# Patient Record
Sex: MALE | Race: BLACK OR AFRICAN AMERICAN | Employment: FULL TIME | URBAN - METROPOLITAN AREA
[De-identification: names, ages, dates, MRNs, and addresses within clinical notes are randomized per-mention and may not be internally consistent; named-entity substitution may affect disease eponyms.]

---

## 2017-11-09 RX ORDER — ROSUVASTATIN CALCIUM 40 MG/1
40 TABLET, COATED ORAL DAILY
COMMUNITY

## 2017-11-09 RX ORDER — METFORMIN HYDROCHLORIDE 500 MG/1
1000 TABLET ORAL
COMMUNITY

## 2017-11-09 RX ORDER — LISINOPRIL 5 MG/1
5 TABLET ORAL DAILY
COMMUNITY

## 2017-11-09 RX ORDER — LEVOTHYROXINE SODIUM 150 UG/1
150 TABLET ORAL
COMMUNITY

## 2017-11-10 ENCOUNTER — ANESTHESIA (OUTPATIENT)
Dept: ENDOSCOPY | Age: 47
End: 2017-11-10
Payer: COMMERCIAL

## 2017-11-10 ENCOUNTER — HOSPITAL ENCOUNTER (OUTPATIENT)
Age: 47
Setting detail: OUTPATIENT SURGERY
Discharge: HOME | End: 2017-11-10
Attending: SPECIALIST | Admitting: SPECIALIST
Payer: COMMERCIAL

## 2017-11-10 ENCOUNTER — ANESTHESIA EVENT (OUTPATIENT)
Dept: ENDOSCOPY | Age: 47
End: 2017-11-10
Payer: COMMERCIAL

## 2017-11-10 VITALS
HEART RATE: 52 BPM | DIASTOLIC BLOOD PRESSURE: 87 MMHG | BODY MASS INDEX: 36.99 KG/M2 | OXYGEN SATURATION: 100 % | WEIGHT: 222 LBS | TEMPERATURE: 98.1 F | HEIGHT: 65 IN | SYSTOLIC BLOOD PRESSURE: 124 MMHG | RESPIRATION RATE: 13 BRPM

## 2017-11-10 PROCEDURE — 77030027957 HC TBNG IRR ENDOGTR BUSS -B: Performed by: SPECIALIST

## 2017-11-10 PROCEDURE — 88305 TISSUE EXAM BY PATHOLOGIST: CPT | Performed by: SPECIALIST

## 2017-11-10 PROCEDURE — 77030009426 HC FCPS BIOP ENDOSC BSC -B: Performed by: SPECIALIST

## 2017-11-10 PROCEDURE — 76040000007: Performed by: SPECIALIST

## 2017-11-10 PROCEDURE — 74011250636 HC RX REV CODE- 250/636

## 2017-11-10 PROCEDURE — 76060000032 HC ANESTHESIA 0.5 TO 1 HR: Performed by: SPECIALIST

## 2017-11-10 RX ORDER — NALOXONE HYDROCHLORIDE 0.4 MG/ML
0.4 INJECTION, SOLUTION INTRAMUSCULAR; INTRAVENOUS; SUBCUTANEOUS
Status: DISCONTINUED | OUTPATIENT
Start: 2017-11-10 | End: 2017-11-10 | Stop reason: HOSPADM

## 2017-11-10 RX ORDER — LORAZEPAM 2 MG/ML
2 INJECTION INTRAMUSCULAR AS NEEDED
Status: DISCONTINUED | OUTPATIENT
Start: 2017-11-10 | End: 2017-11-10 | Stop reason: HOSPADM

## 2017-11-10 RX ORDER — EPINEPHRINE 0.1 MG/ML
1 INJECTION INTRACARDIAC; INTRAVENOUS
Status: DISCONTINUED | OUTPATIENT
Start: 2017-11-10 | End: 2017-11-10 | Stop reason: HOSPADM

## 2017-11-10 RX ORDER — MIDAZOLAM HYDROCHLORIDE 1 MG/ML
.25-1 INJECTION, SOLUTION INTRAMUSCULAR; INTRAVENOUS
Status: DISCONTINUED | OUTPATIENT
Start: 2017-11-10 | End: 2017-11-10 | Stop reason: HOSPADM

## 2017-11-10 RX ORDER — DEXTROMETHORPHAN/PSEUDOEPHED 2.5-7.5/.8
1.2 DROPS ORAL
Status: DISCONTINUED | OUTPATIENT
Start: 2017-11-10 | End: 2017-11-10 | Stop reason: HOSPADM

## 2017-11-10 RX ORDER — ATROPINE SULFATE 0.1 MG/ML
0.5 INJECTION INTRAVENOUS
Status: DISCONTINUED | OUTPATIENT
Start: 2017-11-10 | End: 2017-11-10 | Stop reason: HOSPADM

## 2017-11-10 RX ORDER — PROPOFOL 10 MG/ML
INJECTION, EMULSION INTRAVENOUS AS NEEDED
Status: DISCONTINUED | OUTPATIENT
Start: 2017-11-10 | End: 2017-11-10 | Stop reason: HOSPADM

## 2017-11-10 RX ORDER — SODIUM CHLORIDE 0.9 % (FLUSH) 0.9 %
5-10 SYRINGE (ML) INJECTION EVERY 8 HOURS
Status: DISCONTINUED | OUTPATIENT
Start: 2017-11-10 | End: 2017-11-10 | Stop reason: HOSPADM

## 2017-11-10 RX ORDER — SODIUM CHLORIDE 0.9 % (FLUSH) 0.9 %
5-10 SYRINGE (ML) INJECTION AS NEEDED
Status: DISCONTINUED | OUTPATIENT
Start: 2017-11-10 | End: 2017-11-10 | Stop reason: HOSPADM

## 2017-11-10 RX ORDER — FLUMAZENIL 0.1 MG/ML
0.2 INJECTION INTRAVENOUS
Status: DISCONTINUED | OUTPATIENT
Start: 2017-11-10 | End: 2017-11-10 | Stop reason: HOSPADM

## 2017-11-10 RX ORDER — FENTANYL CITRATE 50 UG/ML
200 INJECTION, SOLUTION INTRAMUSCULAR; INTRAVENOUS
Status: DISCONTINUED | OUTPATIENT
Start: 2017-11-10 | End: 2017-11-10 | Stop reason: HOSPADM

## 2017-11-10 RX ORDER — SODIUM CHLORIDE 9 MG/ML
INJECTION, SOLUTION INTRAVENOUS
Status: DISCONTINUED | OUTPATIENT
Start: 2017-11-10 | End: 2017-11-10 | Stop reason: HOSPADM

## 2017-11-10 RX ORDER — SODIUM CHLORIDE 9 MG/ML
50 INJECTION, SOLUTION INTRAVENOUS CONTINUOUS
Status: DISCONTINUED | OUTPATIENT
Start: 2017-11-10 | End: 2017-11-10 | Stop reason: HOSPADM

## 2017-11-10 RX ADMIN — PROPOFOL 30 MG: 10 INJECTION, EMULSION INTRAVENOUS at 13:08

## 2017-11-10 RX ADMIN — PROPOFOL 50 MG: 10 INJECTION, EMULSION INTRAVENOUS at 13:05

## 2017-11-10 RX ADMIN — PROPOFOL 50 MG: 10 INJECTION, EMULSION INTRAVENOUS at 13:12

## 2017-11-10 RX ADMIN — SODIUM CHLORIDE: 9 INJECTION, SOLUTION INTRAVENOUS at 12:45

## 2017-11-10 RX ADMIN — PROPOFOL 50 MG: 10 INJECTION, EMULSION INTRAVENOUS at 13:14

## 2017-11-10 RX ADMIN — PROPOFOL 100 MG: 10 INJECTION, EMULSION INTRAVENOUS at 13:04

## 2017-11-10 RX ADMIN — PROPOFOL 20 MG: 10 INJECTION, EMULSION INTRAVENOUS at 13:15

## 2017-11-10 RX ADMIN — PROPOFOL 50 MG: 10 INJECTION, EMULSION INTRAVENOUS at 13:10

## 2017-11-10 NOTE — DISCHARGE INSTRUCTIONS
Rosalind Sullivandakotah  [id number]  1970    COLON DISCHARGE INSTRUCTIONS  Discomfort:  Redness at IV site- apply warm compress to area; if redness or soreness persist- contact your physician  There may be a slight amount of blood passed from the rectum  Gaseous discomfort- walking, belching will help relieve any discomfort  You may not operate a vehicle for 12 hours  You may not engage in an occupation involving machinery or appliances for rest of today  You may not drink alcoholic beverages for at least 12 hours  Avoid making any critical decisions for at least 24 hour  DIET:   Regular diet. - however -  remember your colon is empty and a heavy meal will produce gas. Avoid these foods:  vegetables, fried / greasy foods, carbonated drinks for today. ACTIVITY:  You may resume your normal daily activities it is recommended that you spend the remainder of the day resting -  avoid any strenuous activity. CALL M.D. ANY SIGN OF:  Increasing pain, nausea, vomiting  Abdominal distension (swelling)  New increased bleeding (oral or rectal)  Fever (chills)  Pain in chest area  Bloody discharge from nose or mouth  Shortness of breath    You may  take any Advil, Aspirin, Ibuprofen, Motrin, Aleve, Goodys, or any similar pain or arthritis products orTylenol as needed for pain.       Follow-up Instructions:   Call Dr. Mikel Lozano  Results of procedure / biopsy in 10-14days   Office telephone for problems or questions [phone]    Recommendation for next colonscopy in 5 years  If < 10 years, reason:  above average risk patient because of family history of colon polyps

## 2017-11-10 NOTE — PROCEDURES
Colonoscopy Procedure Note    Indications:   Family history of coloretal adenoma  (screening only), Occult blood in stool, occassional bloating and diarrhea  Referring Physician: Nadege Murrell MD   Anesthesia/Sedation:MAC  Endoscopist:  Dr. Jing Lanza  Assistant:  Endoscopy RN-1: Lahoma Meckel, RN    Preoperative diagnosis: colon    Postoperative diagnosis: Normal Colon      Procedure in Detail:  Informed consent was obtained for the procedure, including sedation. Risks of perforation, hemorrhage, adverse drug reaction, and aspiration were discussed. The patient was placed in the left lateral decubitus position. Based on the pre-procedure assessment, including review of the patient's medical history, medications, allergies, and review of systems, he had been deemed to be an appropriate candidate for moderate sedation; he was therefore sedated with the medications listed above. The patient was monitored continuously with ECG tracing, pulse oximetry, blood pressure monitoring, and direct observations. A rectal examination was performed. The [id number] was inserted into the rectum and advanced under direct vision to the terminal ileum. The quality of the colonic preparation was excellent. A careful inspection was made as the colonoscope was withdrawn, including a retroflexed view of the rectum; findings and interventions are described below. Findings: random Bx's for possible microscopic colitis  Rectum: normal  Sigmoid: normal  Descending Colon: normal  Transverse Colon: normal  Ascending Colon: normal  Cecum: normal  Terminal Ileum: normal    Specimens:     see above    EBL: None    Complications: None; patient tolerated the procedure well. Recommendations:     - Await pathology. - Repeat colonoscopy in 5 years.      - If < 10 years, reason: above average risk patient    Signed By: Stephy Elliott MD                        November 10, 2017

## 2017-11-10 NOTE — ROUTINE PROCESS
Julio Doloressisi  1970  [id number]    Situation:  Verbal report received from: Tamika Kapadia RN  Procedure: Procedure(s) with comments:  COLONOSCOPY - colon  COLON BIOPSY - random colon biopsies    Background:    Preoperative diagnosis: colon  Postoperative diagnosis: Normal Colon    :  Dr. Rajani Baron  Assistant(s): Endoscopy Technician-1: Susi Zafar  Endoscopy RN-1: Ronel Elizondo RN    Specimens:   ID Type Source Tests Collected by Time Destination   1 : biopsy Preservative   Ann High MD 11/10/2017 1317 Pathology     H. Pylori  no    Assessment:  Intra-procedure medications       Anesthesia gave intra-procedure sedation and medications, see anesthesia flow sheet yes    Intravenous fluids: NS@ KVO     Vital signs stable     Abdominal assessment: round and soft   Recommendation:  Discharge patient per MD order  Family or Friend   Permission to share finding with family or friend yes

## 2017-11-10 NOTE — ANESTHESIA POSTPROCEDURE EVALUATION
Post-Anesthesia Evaluation and Assessment    Patient: Brenda Cartagena MRN: [id number]  SSN: [id number]    YOB: 1970  Age: 42 y.o. Sex: male       Cardiovascular Function/Vital Signs  Visit Vitals    /48    Pulse (!) 59    Temp 36.7 °C (98.1 °F)    Resp 24    Ht 5' 5\" (1.651 m)    Wt 100.7 kg (222 lb)    SpO2 97%    BMI 36.94 kg/m2       Patient is status post MAC anesthesia for Procedure(s):  COLONOSCOPY  COLON BIOPSY. Nausea/Vomiting: None    Postoperative hydration reviewed and adequate. Pain:  Pain Scale 1: Numeric (0 - 10) (11/10/17 1345)  Pain Intensity 1: 0 (11/10/17 1345)   Managed    Neurological Status: At baseline    Mental Status and Level of Consciousness: Arousable    Pulmonary Status:   O2 Device: Room air (11/10/17 1345)   Adequate oxygenation and airway patent    Complications related to anesthesia: None    Post-anesthesia assessment completed.  No concerns    Signed By: Miguelina Benavidez MD     November 10, 2017

## 2017-11-10 NOTE — IP AVS SNAPSHOT
[address] 
[phone] Patient: Ke Cortez MRN: [id number] RTP:[id number]/36/1145 About your hospitalization You were admitted on:  November 10, 2017 You last received care in the:  [facility] ENDOSCOPY You were discharged on:  November 10, 2017 Why you were hospitalized Your primary diagnosis was:  Not on File Discharge Orders None A check abdoulaye indicates which time of day the medication should be taken. My Medications TAKE these medications as instructed Instructions Each Dose to Equal  
 Morning Noon Evening Bedtime CRESTOR 40 mg tablet Generic drug:  rosuvastatin Your last dose was: Your next dose is: Take 40 mg by mouth daily. 40 mg  
    
   
   
   
  
 levothyroxine 150 mcg tablet Commonly known as:  SYNTHROID Your last dose was: Your next dose is: Take 150 mcg by mouth Daily (before breakfast). 150 mcg  
    
   
   
   
  
 lisinopril 5 mg tablet Commonly known as:  Espinal Edman Your last dose was: Your next dose is: Take 5 mg by mouth daily. 5 mg  
    
   
   
   
  
 metFORMIN 500 mg tablet Commonly known as:  GLUCOPHAGE Your last dose was: Your next dose is: Take 1,000 mg by mouth daily (after dinner). 1000 mg Discharge Instructions Ke Cortez 
[id number] 
1970 COLON DISCHARGE INSTRUCTIONS Discomfort: 
Redness at IV site- apply warm compress to area; if redness or soreness persist- contact your physician There may be a slight amount of blood passed from the rectum Gaseous discomfort- walking, belching will help relieve any discomfort You may not operate a vehicle for 12 hours You may not engage in an occupation involving machinery or appliances for rest of today You may not drink alcoholic beverages for at least 12 hours Avoid making any critical decisions for at least 24 hour DIET: 
 Regular diet.  however -  remember your colon is empty and a heavy meal will produce gas. Avoid these foods:  vegetables, fried / greasy foods, carbonated drinks for today. ACTIVITY: 
You may resume your normal daily activities it is recommended that you spend the remainder of the day resting -  avoid any strenuous activity. CALL M.D. ANY SIGN OF: Increasing pain, nausea, vomiting Abdominal distension (swelling) New increased bleeding (oral or rectal) Fever (chills) Pain in chest area Bloody discharge from nose or mouth Shortness of breath You may  take any Advil, Aspirin, Ibuprofen, Motrin, Aleve, Goodys, or any similar pain or arthritis products orTylenol as needed for pain. Follow-up Instructions: 
 Call Dr. Brittanie Carranza Results of procedure / biopsy in 10-14days Office telephone for problems or questions [phone] Recommendation for next colonscopy in 5 years If < 10 years, reason:  above average risk patient because of family history of colon polyps Introducing hospitals & HEALTH SERVICES! Dilma Suarez introduces [employer] patient portal. Now you can access parts of your medical record, email your doctor's office, and request medication refills online. 1. In your internet browser, go to [email or web address] 2. Click on the First Time User? Click Here link in the Sign In box. You will see the New Member Sign Up page. 3. Enter your [employer] Access Code exactly as it appears below. You will not need to use this code after youve completed the sign-up process. If you do not sign up before the expiration date, you must request a new code. · [employer] Access Code: [email or web address] Expires: 2/8/2018  1:57 PM 
 
4.  Enter the last four digits of your Social Security Number (xxxx) and Date of Birth (mm/dd/yyyy) as indicated and click Submit. You will be taken to the next sign-up page. 5. Create a [employer] ID. This will be your [employer] login ID and cannot be changed, so think of one that is secure and easy to remember. 6. Create a [employer] password. You can change your password at any time. 7. Enter your Password Reset Question and Answer. This can be used at a later time if you forget your password. 8. Enter your e-mail address. You will receive e-mail notification when new information is available in [address] 9. Click Sign Up. You can now view and download portions of your medical record. 10. Click the Download Summary menu link to download a portable copy of your medical information. If you have questions, please visit the Frequently Asked Questions section of the [employer] website. Remember, [employer] is NOT to be used for urgent needs. For medical emergencies, dial 911. Now available from your iPhone and Android! Providers Seen During Your Hospitalization Provider Specialty Primary office phone Ann High MD Gastroenterology [phone] Your Primary Care Physician (PCP) Primary Care Physician Office Phone Office Fax Mell Argueta [phone] You are allergic to the following Allergen Reactions Other Food Hives INH - hives all over. Pt states this was given because he had an allergic reaction to the PPD test.  
    
 Adhesive Tape-Silicones Other (comments) Leaves a burn abdoulaye where the adhesive  from a bandaid was touching the skin. Recent Documentation Height Weight BMI Smoking Status 1.651 m 100.7 kg 36.94 kg/m2 Never Smoker Emergency Contacts Name Discharge Info Relation Home Work Mobile ScottyLizz DISCHARGE CAREGIVER [3] Spouse [3] [phone] Patient Belongings The following personal items are in your possession at time of discharge: Visual Aid: None Please provide this summary of care documentation to your next provider. Signatures-by signing, you are acknowledging that this After Visit Summary has been reviewed with you and you have received a copy. Patient Signature:  ____________________________________________________________ Date:  ____________________________________________________________  
  
[facility] Signature:  ____________________________________________________________ Date:  ____________________________________________________________

## 2017-11-10 NOTE — ANESTHESIA PREPROCEDURE EVALUATION
Anesthetic History   No history of anesthetic complications            Review of Systems / Medical History  Patient summary reviewed, nursing notes reviewed and pertinent labs reviewed    Pulmonary  Within defined limits                 Neuro/Psych   Within defined limits           Cardiovascular  Within defined limits  Hypertension                   GI/Hepatic/Renal  Within defined limits              Endo/Other  Within defined limits  Diabetes  Hypothyroidism       Other Findings              Physical Exam    Airway  Mallampati: II  TM Distance: > 6 cm  Neck ROM: normal range of motion   Mouth opening: Normal     Cardiovascular  Regular rate and rhythm,  S1 and S2 normal,  no murmur, click, rub, or gallop             Dental  No notable dental hx       Pulmonary  Breath sounds clear to auscultation               Abdominal  GI exam deferred       Other Findings            Anesthetic Plan    ASA: 2  Anesthesia type: MAC          Induction: Intravenous  Anesthetic plan and risks discussed with: Patient

## 2017-11-10 NOTE — H&P
Pre-endoscopy H and P for Colonoscopy    The patient was seen and examined. Date of last colonoscopy: 2007, Polyps  No      The airway was assessed and documented. The problem list, past medical history, and medications were reviewed. There is no problem list on file for this patient. Social History     Social History    Marital status:      Spouse name: N/A    Number of children: N/A    Years of education: N/A     Occupational History    Not on file. Social History Main Topics    Smoking status: Never Smoker    Smokeless tobacco: Never Used    Alcohol use Yes      Comment: very little    Drug use: Not on file    Sexual activity: Not on file     Other Topics Concern    Not on file     Social History Narrative     Past Medical History:   Diagnosis Date    Adverse effect of anesthesia     pulmonary edema - stopped breathing in recovery room    Diabetes ([facility] [address]     Hypertension     Ill-defined condition     increased cholesterol    Thyroid disease      The patient has a family history of na    Prior to Admission Medications   Prescriptions Last Dose Informant Patient Reported? Taking?   levothyroxine (SYNTHROID) 150 mcg tablet 11/9/2017 at Unknown time  Yes Yes   Sig: Take 150 mcg by mouth Daily (before breakfast). lisinopril (PRINIVIL, ZESTRIL) 5 mg tablet 11/9/2017 at Unknown time  Yes Yes   Sig: Take 5 mg by mouth daily. metFORMIN (GLUCOPHAGE) 500 mg tablet 11/9/2017 at Unknown time  Yes Yes   Sig: Take 1,000 mg by mouth daily (after dinner). rosuvastatin (CRESTOR) 40 mg tablet 11/9/2017 at Unknown time  Yes Yes   Sig: Take 40 mg by mouth daily. Facility-Administered Medications: None         The review of systems is:  negative for shortness of breath or chest pain      The heart, lungs and mental status were satisfactory for the administration of MAC sedation and for the procedure. Mallampati score: See Anesthesia.     I discussed with the patient the objectives, risks, consequences and alternatives to the procedure. Plan: Endoscopic procedure with MAC sedation.     Gisela Wilkes MD  11/10/2017  12:59 PM

## (undated) DEVICE — BAG SPEC BIOHZD LF 2MIL 6X10IN -- CONVERT TO ITEM 357326

## (undated) DEVICE — CONNECTOR TBNG AUX H2O JET DISP FOR OLY 160/180 SER

## (undated) DEVICE — QUILTED PREMIUM COMFORT UNDERPAD,EXTRA HEAVY: Brand: WINGS

## (undated) DEVICE — Z DISCONTINUED USE 2751540 TUBING IRRIG L10IN DISP PMP ENDOGATOR

## (undated) DEVICE — AIRLIFE™ U/CONNECT-IT OXYGEN TUBING 7 FEET (2.1 M) CRUSH-RESISTANT OXYGEN TUBING, VINYL TIPPED: Brand: AIRLIFE™

## (undated) DEVICE — CONTAINER SPEC 20 ML LID NEUT BUFF FORMALIN 10 % POLYPR STS

## (undated) DEVICE — SET ADMIN 16ML TBNG L100IN 2 Y INJ SITE IV PIGGY BK DISP

## (undated) DEVICE — SOLIDIFIER FLUID 3000 CC ABSORB

## (undated) DEVICE — 1200 GUARD II KIT W/5MM TUBE W/O VAC TUBE: Brand: GUARDIAN

## (undated) DEVICE — KIT IV STRT W CHLORAPREP PD 1ML

## (undated) DEVICE — KENDALL RADIOLUCENT FOAM MONITORING ELECTRODE -RECTANGULAR SHAPE: Brand: KENDALL

## (undated) DEVICE — BW-412T DISP COMBO CLEANING BRUSH: Brand: SINGLE USE COMBINATION CLEANING BRUSH

## (undated) DEVICE — SYRINGE MED 20ML STD CLR PLAS LUERLOCK TIP N CTRL DISP

## (undated) DEVICE — BAG BELONG PT PERS CLEAR HANDL

## (undated) DEVICE — CATH IV AUTOGRD BC BLU 22GA 25 -- INSYTE

## (undated) DEVICE — SET EXTN TBNG L BOR 4 W STPCOCK ST 32IN PRIMING VOL 6ML

## (undated) DEVICE — NEEDLE HYPO 18GA L1.5IN PNK S STL HUB POLYPR SHLD REG BVL

## (undated) DEVICE — ENDO CARRY-ON PROCEDURE KIT INCLUDES ENZYMATIC SPONGE, GAUZE, BIOHAZARD LABEL, TRAY, LUBRICANT, DIRTY SCOPE LABEL, WATER LABEL, TRAY, DRAWSTRING PAD, AND DEFENDO 4-PIECE KIT.: Brand: ENDO CARRY-ON PROCEDURE KIT

## (undated) DEVICE — FORCEPS BX L240CM JAW DIA2.8MM L CAP W/ NDL MIC MESH TOOTH